# Patient Record
Sex: FEMALE | Race: ASIAN | NOT HISPANIC OR LATINO
[De-identification: names, ages, dates, MRNs, and addresses within clinical notes are randomized per-mention and may not be internally consistent; named-entity substitution may affect disease eponyms.]

---

## 2024-08-06 ENCOUNTER — NON-APPOINTMENT (OUTPATIENT)
Age: 39
End: 2024-08-06

## 2024-08-06 PROBLEM — Z00.00 ENCOUNTER FOR PREVENTIVE HEALTH EXAMINATION: Status: ACTIVE | Noted: 2024-08-06

## 2024-08-07 ENCOUNTER — APPOINTMENT (OUTPATIENT)
Dept: CARDIOLOGY | Facility: CLINIC | Age: 39
End: 2024-08-07

## 2024-08-07 ENCOUNTER — NON-APPOINTMENT (OUTPATIENT)
Age: 39
End: 2024-08-07

## 2024-08-07 PROBLEM — R00.1 BRADYCARDIA: Status: ACTIVE | Noted: 2024-08-07

## 2024-08-07 PROBLEM — R06.00 DYSPNEA, UNSPECIFIED: Status: ACTIVE | Noted: 2024-08-07

## 2024-08-07 PROBLEM — Z82.49 FAMILY HISTORY OF MYOCARDIAL INFARCTION: Status: ACTIVE | Noted: 2024-08-07

## 2024-08-07 PROCEDURE — 93000 ELECTROCARDIOGRAM COMPLETE: CPT

## 2024-08-07 PROCEDURE — G2211 COMPLEX E/M VISIT ADD ON: CPT | Mod: NC

## 2024-08-07 PROCEDURE — 99204 OFFICE O/P NEW MOD 45 MIN: CPT | Mod: 25

## 2024-08-14 NOTE — HISTORY OF PRESENT ILLNESS
[FreeTextEntry1] : 38 year old F with no significant PMH who was referred for bradycardia.  Pt reports a history of slow HR for a long time. However, more recently she has been monitoring with her watch. She slept with her watch and it alarmed that her HRs were in the 30s. She does report snoring at night when she is tired. She saw her PCP who recommended pt come to cardiology for evaluation. 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 She doesn't exercise much so she would occasionally feel short of breath with exertion/exercise. She denies CP, dizziness, lightheadedness, orthopnea, PND, or LE edema. She had episode of syncope when she was younger in setting of getting up quickly.

## 2024-08-14 NOTE — ASSESSMENT
[FreeTextEntry1] : 38 year old F with no significant PMH who was referred for bradycardia.  Pt reports a history of slow HR for a long time. However, more recently she has been monitoring with her watch. She slept with her watch and it alarmed that her HRs were in the 30s. She does report snoring at night when she is tired. She saw her PCP who recommended pt come to cardiology for evaluation. 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 She doesn't exercise much so she would occasionally feel short of breath with exertion/exercise. She denies CP, dizziness, lightheadedness, orthopnea, PND, or LE edema. She had episode of syncope when she was younger in setting of getting up quickly.  1) Bradycardia 2) Dyspnea, with exercise - EKG 8/7/24 showed sinus bradycardia without significant abnormalities - I advised pt to undergo 3 day event monitor to r/o significant bradyarrhythmia which showed sinus rhythm , avg 56 bpm, rare PACs, rare PVCs, no significant arrhythmia identified. Majority of the sinus bradycardia is during sleep which is not a concerning finding. - Given SOB on exertion, I advised pt to undergo TTE to r/o structural heart disease and r/o valvular pathology - Pt reports snoring at night. Can consider DONI testing.  3) Follow-up, after TTE, 1 month

## 2024-08-19 ENCOUNTER — TRANSCRIPTION ENCOUNTER (OUTPATIENT)
Age: 39
End: 2024-08-19

## 2024-08-19 ENCOUNTER — APPOINTMENT (OUTPATIENT)
Dept: CARDIOLOGY | Facility: CLINIC | Age: 39
End: 2024-08-19
Payer: COMMERCIAL

## 2024-08-19 VITALS
HEART RATE: 53 BPM | SYSTOLIC BLOOD PRESSURE: 115 MMHG | OXYGEN SATURATION: 100 % | RESPIRATION RATE: 18 BRPM | DIASTOLIC BLOOD PRESSURE: 74 MMHG

## 2024-08-19 PROCEDURE — 93306 TTE W/DOPPLER COMPLETE: CPT
